# Patient Record
Sex: FEMALE | Race: BLACK OR AFRICAN AMERICAN | NOT HISPANIC OR LATINO
[De-identification: names, ages, dates, MRNs, and addresses within clinical notes are randomized per-mention and may not be internally consistent; named-entity substitution may affect disease eponyms.]

---

## 2019-11-25 ENCOUNTER — APPOINTMENT (OUTPATIENT)
Dept: FAMILY MEDICINE | Facility: CLINIC | Age: 24
End: 2019-11-25

## 2019-11-27 ENCOUNTER — LABORATORY RESULT (OUTPATIENT)
Age: 24
End: 2019-11-27

## 2019-11-27 ENCOUNTER — NON-APPOINTMENT (OUTPATIENT)
Age: 24
End: 2019-11-27

## 2019-11-27 ENCOUNTER — APPOINTMENT (OUTPATIENT)
Dept: INTERNAL MEDICINE | Facility: CLINIC | Age: 24
End: 2019-11-27
Payer: MEDICAID

## 2019-11-27 VITALS
DIASTOLIC BLOOD PRESSURE: 78 MMHG | HEIGHT: 61 IN | WEIGHT: 102 LBS | HEART RATE: 88 BPM | SYSTOLIC BLOOD PRESSURE: 118 MMHG | OXYGEN SATURATION: 99 % | BODY MASS INDEX: 19.26 KG/M2 | TEMPERATURE: 97.6 F

## 2019-11-27 DIAGNOSIS — Z00.00 ENCOUNTER FOR GENERAL ADULT MEDICAL EXAMINATION W/OUT ABNORMAL FINDINGS: ICD-10-CM

## 2019-11-27 DIAGNOSIS — K62.5 HEMORRHAGE OF ANUS AND RECTUM: ICD-10-CM

## 2019-11-27 LAB — HCG UR QL: NEGATIVE

## 2019-11-27 PROCEDURE — 36415 COLL VENOUS BLD VENIPUNCTURE: CPT

## 2019-11-27 PROCEDURE — 81025 URINE PREGNANCY TEST: CPT

## 2019-11-27 PROCEDURE — 99204 OFFICE O/P NEW MOD 45 MIN: CPT | Mod: 25

## 2019-11-27 PROCEDURE — 93000 ELECTROCARDIOGRAM COMPLETE: CPT

## 2019-11-27 NOTE — PHYSICAL EXAM
[No Acute Distress] : no acute distress [Well Nourished] : well nourished [Normal Sclera/Conjunctiva] : normal sclera/conjunctiva [Normal Oropharynx] : the oropharynx was normal [No Lymphadenopathy] : no lymphadenopathy [Thyroid Normal, No Nodules] : the thyroid was normal and there were no nodules present [Clear to Auscultation] : lungs were clear to auscultation bilaterally [Normal Rate] : normal rate  [Regular Rhythm] : with a regular rhythm [No Edema] : there was no peripheral edema [Soft] : abdomen soft [Non Tender] : non-tender [No HSM] : no HSM [Declined Rectal Exam] : declined rectal exam [No CVA Tenderness] : no CVA  tenderness [No Rash] : no rash [Normal] : affect was normal and insight and judgment were intact

## 2019-11-27 NOTE — PLAN
[FreeTextEntry1] : 25 yo w chronic fatigue/anemia/heavy menses x 5 yrs - dx  iron def anemia- advised  BCP and IV iron infusion but did not f/u- takes PO iron on/off- thinks may have been told of Sickle Cell trait- occasional BRBPR x 5 years w hard stool/constipation - \par \par 1.chronic fatigue- h/o iron def anemia/iron def - check CBC CMP TFT UA UPT FS Hb a1c Vitamin B12, folic acid, iron ferritin Hb electrophoresis, LINDSAY, EKG , drug screen\par \par 2. possible h/o Sickle cell Trait- check Hb electrophoresis, folic acid\par \par 3. recurrent UTI- check UA Hb a1c ref given GYN\par \par 4. heavy menses unclear etiology- continues to decline BCP- check CBC, TFTs- ref given GYN \par \par 5. BRBPR- may be due to chronic constipation - declined rectal/guaiac exam/ref to GI- check CBC- increase PO fluids, fruits, vegetables- start Colace 1-2 tabs daily- declined ref to GI- Potential consequences reviewed. Patient expressed understanding.\par \par 6. Flu shot, Tdap- declined\par \par 7. Gardasil- does not know if had vaccine- pt to send copies of immunization record \par \par \par f/u after above

## 2019-11-27 NOTE — REVIEW OF SYSTEMS
[Fatigue] : fatigue [Constipation] : constipation [Negative] : Heme/Lymph [Fever] : no fever [Vision Problems] : no vision problems [Hearing Loss] : no hearing loss [Chest Pain] : no chest pain [Palpitations] : no palpitations [Shortness Of Breath] : no shortness of breath [Abdominal Pain] : no abdominal pain [Heartburn] : no heartburn [Melena] : no melena [Dysuria] : no dysuria [Hematuria] : no hematuria [Skin Rash] : no skin rash [FreeTextEntry2] : a [FreeTextEntry7] : BRBPR

## 2019-11-27 NOTE — HISTORY OF PRESENT ILLNESS
[FreeTextEntry1] : "I need a ref for IV iron"\par  [de-identified] : c/o h/o anemia for past 4 yo due to heavy menses- advised IV iron- did not f/u\par heavy menses- etiology?- declined BCP- \par last f/u GYN 9/2019 walk in clinic - dx BV- tx Metro gel - advised BCP for heavy menses- declined \par no h/o painless hematuria/hemoptysis/melena/PUD\par no malar rash/alopecia/joint pains \par always w desire to eat ice \par h/o BRBPR on toilet tissue x 5 years - never evaluated- chronic constipation- no rectal pain- no h/o hemorrhoids- OTC stool softeners on/off  w relief \par h/o UTI recurring - saw GYN 9/2019- told due to how wipes after urinating \par LMP 3 weeks ago- [facility] - uses condoms w sex toys - \par not sure if had Gardasil- last Tdap ??\par OTC none \par work student

## 2019-12-02 ENCOUNTER — APPOINTMENT (OUTPATIENT)
Dept: INTERNAL MEDICINE | Facility: CLINIC | Age: 24
End: 2019-12-02

## 2019-12-05 ENCOUNTER — APPOINTMENT (OUTPATIENT)
Dept: INTERNAL MEDICINE | Facility: CLINIC | Age: 24
End: 2019-12-05
Payer: MEDICAID

## 2019-12-05 VITALS
OXYGEN SATURATION: 100 % | SYSTOLIC BLOOD PRESSURE: 119 MMHG | HEIGHT: 61 IN | HEART RATE: 95 BPM | DIASTOLIC BLOOD PRESSURE: 80 MMHG | BODY MASS INDEX: 19.26 KG/M2 | WEIGHT: 102 LBS | TEMPERATURE: 97.8 F

## 2019-12-05 DIAGNOSIS — R79.89 OTHER SPECIFIED ABNORMAL FINDINGS OF BLOOD CHEMISTRY: ICD-10-CM

## 2019-12-05 DIAGNOSIS — N92.0 EXCESSIVE AND FREQUENT MENSTRUATION WITH REGULAR CYCLE: ICD-10-CM

## 2019-12-05 DIAGNOSIS — R53.82 CHRONIC FATIGUE, UNSPECIFIED: ICD-10-CM

## 2019-12-05 LAB
25(OH)D3 SERPL-MCNC: 12.4 NG/ML
ALBUMIN SERPL ELPH-MCNC: 4.6 G/DL
ALP BLD-CCNC: 55 U/L
ALT SERPL-CCNC: 11 U/L
AMPHET UR-MCNC: NEGATIVE
ANA SER IF-ACNC: NEGATIVE
ANION GAP SERPL CALC-SCNC: 12 MMOL/L
APPEARANCE: ABNORMAL
AST SERPL-CCNC: 23 U/L
BACTERIA: NEGATIVE
BARBITURATES UR-MCNC: NEGATIVE
BASOPHILS # BLD AUTO: 0.02 K/UL
BASOPHILS NFR BLD AUTO: 0.5 %
BENZODIAZ UR-MCNC: NEGATIVE
BILIRUB SERPL-MCNC: 0.2 MG/DL
BILIRUBIN URINE: NEGATIVE
BLOOD URINE: NEGATIVE
BUN SERPL-MCNC: 11 MG/DL
CALCIUM SERPL-MCNC: 9.6 MG/DL
CHLORIDE SERPL-SCNC: 104 MMOL/L
CO2 SERPL-SCNC: 23 MMOL/L
COCAINE METAB.OTHER UR-MCNC: NEGATIVE
COLOR: YELLOW
CREAT SERPL-MCNC: 0.72 MG/DL
CREATININE, URINE: 215.5 MG/DL
DEPRECATED KAPPA LC FREE/LAMBDA SER: 1.38 RATIO
EOSINOPHIL # BLD AUTO: 0.22 K/UL
EOSINOPHIL NFR BLD AUTO: 5.3 %
ESTIMATED AVERAGE GLUCOSE: 100 MG/DL
FERRITIN SERPL-MCNC: 10 NG/ML
FOLATE RBC-MCNC: 871 NG/ML
GLUCOSE QUALITATIVE U: NEGATIVE
GLUCOSE SERPL-MCNC: 74 MG/DL
HBA1C MFR BLD HPLC: 5.1 %
HCT VFR BLD CALC: 35.7 %
HCT VFR BLD CALC: 36.5 %
HGB A MFR BLD: 97.7 %
HGB A2 MFR BLD: 2.3 %
HGB BLD-MCNC: 11.1 G/DL
HGB FRACT BLD-IMP: NORMAL
HYALINE CASTS: 6 /LPF
IGA SER QL IEP: 115 MG/DL
IGG SER QL IEP: 1634 MG/DL
IGM SER QL IEP: 147 MG/DL
IMM GRANULOCYTES NFR BLD AUTO: 0.2 %
IRON SATN MFR SERPL: 7 %
IRON SERPL-MCNC: 25 UG/DL
KAPPA LC CSF-MCNC: 1.17 MG/DL
KAPPA LC SERPL-MCNC: 1.62 MG/DL
KETONES URINE: NEGATIVE
LEUKOCYTE ESTERASE URINE: NEGATIVE
LYMPHOCYTES # BLD AUTO: 1.38 K/UL
LYMPHOCYTES NFR BLD AUTO: 33.5 %
MAN DIFF?: NORMAL
MCHC RBC-ENTMCNC: 25.6 PG
MCHC RBC-ENTMCNC: 30.4 GM/DL
MCV RBC AUTO: 84.3 FL
METHADONE UR-MCNC: NEGATIVE
METHAQUALONE UR-MCNC: NEGATIVE
MICROSCOPIC-UA: NORMAL
MONOCYTES # BLD AUTO: 0.28 K/UL
MONOCYTES NFR BLD AUTO: 6.8 %
NEUTROPHILS # BLD AUTO: 2.21 K/UL
NEUTROPHILS NFR BLD AUTO: 53.7 %
NITRITE URINE: NEGATIVE
OPIATES UR-MCNC: NEGATIVE
PCP UR-MCNC: NEGATIVE
PH URINE: 6.5
PLATELET # BLD AUTO: 189 K/UL
POTASSIUM SERPL-SCNC: 4.4 MMOL/L
PROPOXYPH UR QL: NEGATIVE
PROT SERPL-MCNC: 7.4 G/DL
PROTEIN URINE: NORMAL
RBC # BLD: 4.33 M/UL
RBC # FLD: 15.3 %
RED BLOOD CELLS URINE: 0 /HPF
SODIUM SERPL-SCNC: 139 MMOL/L
SPECIFIC GRAVITY URINE: 1.03
SQUAMOUS EPITHELIAL CELLS: 13 /HPF
THC UR QL: NEGATIVE
TIBC SERPL-MCNC: 384 UG/DL
TSH SERPL-ACNC: 5.16 UIU/ML
UIBC SERPL-MCNC: 359 UG/DL
UROBILINOGEN URINE: NORMAL
VIT B12 SERPL-MCNC: 1309 PG/ML
WBC # FLD AUTO: 4.12 K/UL
WHITE BLOOD CELLS URINE: 1 /HPF

## 2019-12-05 PROCEDURE — 36415 COLL VENOUS BLD VENIPUNCTURE: CPT

## 2019-12-05 PROCEDURE — 99214 OFFICE O/P EST MOD 30 MIN: CPT | Mod: 25

## 2019-12-05 NOTE — REVIEW OF SYSTEMS
[Fatigue] : fatigue [Constipation] : constipation [Negative] : Psychiatric [Fever] : no fever [Vision Problems] : no vision problems [Hearing Loss] : no hearing loss [Palpitations] : no palpitations [Chest Pain] : no chest pain [Shortness Of Breath] : no shortness of breath [Heartburn] : no heartburn [Melena] : no melena [Abdominal Pain] : no abdominal pain [Dysuria] : no dysuria [Hematuria] : no hematuria [Skin Rash] : no skin rash [FreeTextEntry7] : BRBPR

## 2019-12-05 NOTE — PLAN
[FreeTextEntry1] : FLu vaccine - continues to decline - Potential consequences including death reviewed. Patient expressed understanding.\par \par Tdap- continues to decline - Potential consequences including death of infant reviewed. Patient expressed understanding.\par \par Gardasil- not sure if got vaccines- pt to attempt to get copy immunization record- explained had until age 26- Potential consequences including caner reviewed. Patient expressed understanding.\par \par HIV and Hep C testing-  continues to decline- Potential consequences including death reviewed. Patient expressed understanding.\par \par I spent >25 minutes with patient. More than 50% of the time was spent on counseling and coordination of of the problems listed.\par

## 2019-12-09 LAB — TSH SERPL-ACNC: 2.1 UIU/ML

## 2019-12-12 ENCOUNTER — TRANSCRIPTION ENCOUNTER (OUTPATIENT)
Age: 24
End: 2019-12-12

## 2020-01-06 ENCOUNTER — APPOINTMENT (OUTPATIENT)
Dept: HEMATOLOGY ONCOLOGY | Facility: CLINIC | Age: 25
End: 2020-01-06
Payer: MEDICAID

## 2020-01-06 VITALS
WEIGHT: 109.6 LBS | HEIGHT: 61 IN | HEART RATE: 87 BPM | DIASTOLIC BLOOD PRESSURE: 73 MMHG | SYSTOLIC BLOOD PRESSURE: 109 MMHG | BODY MASS INDEX: 20.69 KG/M2 | RESPIRATION RATE: 14 BRPM | TEMPERATURE: 98.2 F | OXYGEN SATURATION: 100 %

## 2020-01-06 DIAGNOSIS — D50.0 IRON DEFICIENCY ANEMIA SECONDARY TO BLOOD LOSS (CHRONIC): ICD-10-CM

## 2020-01-06 DIAGNOSIS — Z78.9 OTHER SPECIFIED HEALTH STATUS: ICD-10-CM

## 2020-01-06 PROCEDURE — 99204 OFFICE O/P NEW MOD 45 MIN: CPT | Mod: 25

## 2020-01-06 PROCEDURE — 36415 COLL VENOUS BLD VENIPUNCTURE: CPT

## 2020-01-07 LAB
ANA SER IF-ACNC: NEGATIVE
APTT BLD: 39 SEC
BASOPHILS # BLD AUTO: 0.02 K/UL
BASOPHILS NFR BLD AUTO: 0.5 %
EOSINOPHIL # BLD AUTO: 0.22 K/UL
EOSINOPHIL NFR BLD AUTO: 5.1 %
ERYTHROCYTE [SEDIMENTATION RATE] IN BLOOD BY WESTERGREN METHOD: 76 MM/HR
FACT VIII ACT/NOR PPP: 91 %
FERRITIN SERPL-MCNC: 14 NG/ML
HAPTOGLOB SERPL-MCNC: 124 MG/DL
HCT VFR BLD CALC: 37.5 %
HGB BLD-MCNC: 11.5 G/DL
IGA SER QL IEP: 121 MG/DL
IMM GRANULOCYTES NFR BLD AUTO: 0.2 %
IRON SATN MFR SERPL: 6 %
IRON SERPL-MCNC: 24 UG/DL
LDH SERPL-CCNC: 200 U/L
LYMPHOCYTES # BLD AUTO: 1.22 K/UL
LYMPHOCYTES NFR BLD AUTO: 28.3 %
MAN DIFF?: NORMAL
MCHC RBC-ENTMCNC: 25.4 PG
MCHC RBC-ENTMCNC: 30.7 GM/DL
MCV RBC AUTO: 83 FL
MONOCYTES # BLD AUTO: 0.23 K/UL
MONOCYTES NFR BLD AUTO: 5.3 %
NEUTROPHILS # BLD AUTO: 2.61 K/UL
NEUTROPHILS NFR BLD AUTO: 60.6 %
PLATELET # BLD AUTO: 184 K/UL
RBC # BLD: 4.52 M/UL
RBC # FLD: 15.3 %
TIBC SERPL-MCNC: 423 UG/DL
TTG IGA SER IA-ACNC: <1.2 U/ML
TTG IGA SER-ACNC: NEGATIVE
TTG IGG SER IA-ACNC: 3.4 U/ML
TTG IGG SER IA-ACNC: NEGATIVE
UIBC SERPL-MCNC: 399 UG/DL
VWF AG PPP IA-ACNC: 108 %
VWF:RCO ACT/NOR PPP PL AGG: 87 %
WBC # FLD AUTO: 4.31 K/UL

## 2020-01-07 NOTE — HISTORY OF PRESENT ILLNESS
[de-identified] : 24 years old [place] American female was found to have iron deficiency anemia after a rectal bleed... Patient's hemoglobin is 11.1... I had a long discussion with patient explaining to her that intravenous iron is usually reserved for people with hemoglobin below 10 g/dL as the procedure carries the risk of allergic reaction and is usually not reimbursed by insurances...

## 2020-01-07 NOTE — CONSULT LETTER
[Dear  ___] : Dear  [unfilled], [Consult Letter:] : I had the pleasure of evaluating your patient, [unfilled]. [Please see my note below.] : Please see my note below. [Consult Closing:] : Thank you very much for allowing me to participate in the care of this patient.  If you have any questions, please do not hesitate to contact me. [Sincerely,] : Sincerely, [FreeTextEntry3] : Yenny Meyers MD\par

## 2020-01-07 NOTE — ASSESSMENT
[FreeTextEntry1] : Repeat blood work, patient's hemoglobin is within normal limits... However her iron stores are low and she can benefit from oral iron..\par \par Patient is very upset crying and demands to get IV iron today... I explained to patient that was a hemoglobin of 11.1 IV iron is not indicated... We will have repeat CBC and decide if intravenous iron is warranted.\par \par She was given GI and GYN referral, and information about intravenous iron

## 2020-01-17 LAB — VWF MULTIMERS PPP IA-ACNC: NORMAL

## 2020-03-10 ENCOUNTER — TRANSCRIPTION ENCOUNTER (OUTPATIENT)
Age: 25
End: 2020-03-10

## 2020-11-26 ENCOUNTER — TRANSCRIPTION ENCOUNTER (OUTPATIENT)
Age: 25
End: 2020-11-26

## 2021-04-02 ENCOUNTER — TRANSCRIPTION ENCOUNTER (OUTPATIENT)
Age: 26
End: 2021-04-02

## 2022-04-08 ENCOUNTER — TRANSCRIPTION ENCOUNTER (OUTPATIENT)
Age: 27
End: 2022-04-08

## 2022-11-28 ENCOUNTER — NON-APPOINTMENT (OUTPATIENT)
Age: 27
End: 2022-11-28

## 2022-12-06 ENCOUNTER — NON-APPOINTMENT (OUTPATIENT)
Age: 27
End: 2022-12-06

## 2025-05-12 ENCOUNTER — EMERGENCY (EMERGENCY)
Facility: HOSPITAL | Age: 30
LOS: 1 days | End: 2025-05-12
Attending: EMERGENCY MEDICINE | Admitting: EMERGENCY MEDICINE
Payer: MEDICAID

## 2025-05-12 VITALS
HEART RATE: 102 BPM | OXYGEN SATURATION: 100 % | DIASTOLIC BLOOD PRESSURE: 77 MMHG | TEMPERATURE: 98 F | RESPIRATION RATE: 18 BRPM | SYSTOLIC BLOOD PRESSURE: 115 MMHG

## 2025-05-12 VITALS
DIASTOLIC BLOOD PRESSURE: 70 MMHG | TEMPERATURE: 99 F | SYSTOLIC BLOOD PRESSURE: 110 MMHG | OXYGEN SATURATION: 99 % | HEART RATE: 96 BPM | RESPIRATION RATE: 16 BRPM

## 2025-05-12 LAB
ALBUMIN SERPL ELPH-MCNC: 3.4 G/DL — SIGNIFICANT CHANGE UP (ref 3.4–5)
ALP SERPL-CCNC: 64 U/L — SIGNIFICANT CHANGE UP (ref 40–120)
ALT FLD-CCNC: 17 U/L — SIGNIFICANT CHANGE UP (ref 12–42)
ANION GAP SERPL CALC-SCNC: 6 MMOL/L — LOW (ref 9–16)
APPEARANCE UR: CLEAR — SIGNIFICANT CHANGE UP
AST SERPL-CCNC: 22 U/L — SIGNIFICANT CHANGE UP (ref 15–37)
BASOPHILS # BLD AUTO: 0.02 K/UL — SIGNIFICANT CHANGE UP (ref 0–0.2)
BASOPHILS NFR BLD AUTO: 0.3 % — SIGNIFICANT CHANGE UP (ref 0–2)
BILIRUB SERPL-MCNC: 0.2 MG/DL — SIGNIFICANT CHANGE UP (ref 0.2–1.2)
BILIRUB UR-MCNC: NEGATIVE — SIGNIFICANT CHANGE UP
BUN SERPL-MCNC: 7 MG/DL — SIGNIFICANT CHANGE UP (ref 7–23)
CALCIUM SERPL-MCNC: 9.7 MG/DL — SIGNIFICANT CHANGE UP (ref 8.5–10.5)
CHLORIDE SERPL-SCNC: 102 MMOL/L — SIGNIFICANT CHANGE UP (ref 96–108)
CO2 SERPL-SCNC: 24 MMOL/L — SIGNIFICANT CHANGE UP (ref 22–31)
COLOR SPEC: YELLOW — SIGNIFICANT CHANGE UP
CREAT SERPL-MCNC: 0.64 MG/DL — SIGNIFICANT CHANGE UP (ref 0.5–1.3)
DIFF PNL FLD: NEGATIVE — SIGNIFICANT CHANGE UP
EGFR: 122 ML/MIN/1.73M2 — SIGNIFICANT CHANGE UP
EGFR: 122 ML/MIN/1.73M2 — SIGNIFICANT CHANGE UP
EOSINOPHIL # BLD AUTO: 0.25 K/UL — SIGNIFICANT CHANGE UP (ref 0–0.5)
EOSINOPHIL NFR BLD AUTO: 3.5 % — SIGNIFICANT CHANGE UP (ref 0–6)
GLUCOSE SERPL-MCNC: 95 MG/DL — SIGNIFICANT CHANGE UP (ref 70–99)
GLUCOSE UR QL: 500 MG/DL
HCG SERPL-ACNC: HIGH MIU/ML
HCT VFR BLD CALC: 33.1 % — LOW (ref 34.5–45)
HGB BLD-MCNC: 10.2 G/DL — LOW (ref 11.5–15.5)
IMM GRANULOCYTES # BLD AUTO: 0.03 K/UL — SIGNIFICANT CHANGE UP (ref 0–0.07)
IMM GRANULOCYTES NFR BLD AUTO: 0.4 % — SIGNIFICANT CHANGE UP (ref 0–0.9)
KETONES UR QL: ABNORMAL MG/DL
LEUKOCYTE ESTERASE UR-ACNC: NEGATIVE — SIGNIFICANT CHANGE UP
LYMPHOCYTES # BLD AUTO: 2.07 K/UL — SIGNIFICANT CHANGE UP (ref 1–3.3)
LYMPHOCYTES NFR BLD AUTO: 29.2 % — SIGNIFICANT CHANGE UP (ref 13–44)
MAGNESIUM SERPL-MCNC: 1.9 MG/DL — SIGNIFICANT CHANGE UP (ref 1.6–2.6)
MCHC RBC-ENTMCNC: 23 PG — LOW (ref 27–34)
MCHC RBC-ENTMCNC: 30.8 G/DL — LOW (ref 32–36)
MCV RBC AUTO: 74.7 FL — LOW (ref 80–100)
MONOCYTES # BLD AUTO: 0.45 K/UL — SIGNIFICANT CHANGE UP (ref 0–0.9)
MONOCYTES NFR BLD AUTO: 6.3 % — SIGNIFICANT CHANGE UP (ref 2–14)
NEUTROPHILS # BLD AUTO: 4.27 K/UL — SIGNIFICANT CHANGE UP (ref 1.8–7.4)
NEUTROPHILS NFR BLD AUTO: 60.3 % — SIGNIFICANT CHANGE UP (ref 43–77)
NITRITE UR-MCNC: NEGATIVE — SIGNIFICANT CHANGE UP
NRBC # BLD AUTO: 0 K/UL — SIGNIFICANT CHANGE UP (ref 0–0)
NRBC # FLD: 0 K/UL — SIGNIFICANT CHANGE UP (ref 0–0)
NRBC BLD AUTO-RTO: 0 /100 WBCS — SIGNIFICANT CHANGE UP (ref 0–0)
PH UR: 6 — SIGNIFICANT CHANGE UP (ref 5–8)
PLATELET # BLD AUTO: 193 K/UL — SIGNIFICANT CHANGE UP (ref 150–400)
PMV BLD: 11.7 FL — SIGNIFICANT CHANGE UP (ref 7–13)
POTASSIUM SERPL-MCNC: 4.5 MMOL/L — SIGNIFICANT CHANGE UP (ref 3.5–5.3)
POTASSIUM SERPL-SCNC: 4.5 MMOL/L — SIGNIFICANT CHANGE UP (ref 3.5–5.3)
PROT SERPL-MCNC: 8.2 G/DL — SIGNIFICANT CHANGE UP (ref 6.4–8.2)
PROT UR-MCNC: NEGATIVE MG/DL — SIGNIFICANT CHANGE UP
RBC # BLD: 4.43 M/UL — SIGNIFICANT CHANGE UP (ref 3.8–5.2)
RBC # FLD: 17.2 % — HIGH (ref 10.3–14.5)
SODIUM SERPL-SCNC: 132 MMOL/L — SIGNIFICANT CHANGE UP (ref 132–145)
SP GR SPEC: 1.03 — SIGNIFICANT CHANGE UP (ref 1–1.03)
UROBILINOGEN FLD QL: 1 MG/DL — SIGNIFICANT CHANGE UP (ref 0.2–1)
WBC # BLD: 7.09 K/UL — SIGNIFICANT CHANGE UP (ref 3.8–10.5)
WBC # FLD AUTO: 7.09 K/UL — SIGNIFICANT CHANGE UP (ref 3.8–10.5)

## 2025-05-12 PROCEDURE — 99284 EMERGENCY DEPT VISIT MOD MDM: CPT

## 2025-05-12 RX ORDER — ONDANSETRON HCL/PF 4 MG/2 ML
4 VIAL (ML) INJECTION ONCE
Refills: 0 | Status: COMPLETED | OUTPATIENT
Start: 2025-05-12 | End: 2025-05-12

## 2025-05-12 RX ORDER — SODIUM CHLORIDE 9 G/1000ML
1000 INJECTION, SOLUTION INTRAVENOUS
Refills: 0 | Status: ACTIVE | OUTPATIENT
Start: 2025-05-12 | End: 2025-05-12

## 2025-05-12 RX ORDER — PROMETHAZINE HYDROCHLORIDE 25 MG/ML
1 INJECTION INTRAMUSCULAR; INTRAVENOUS
Qty: 2 | Refills: 0
Start: 2025-05-12

## 2025-05-12 RX ADMIN — SODIUM CHLORIDE 1000 MILLILITER(S): 9 INJECTION, SOLUTION INTRAVENOUS at 21:42

## 2025-05-12 NOTE — ED PROVIDER NOTE - PROGRESS NOTE DETAILS
Called lab to confirm that patient did not have bacteria in her urine which they were able to do over the phone.  States that since her urine did not show signs of leukocyte esterase or nitrites they did not do a micro reflexively.

## 2025-05-12 NOTE — ED PROVIDER NOTE - CLINICAL SUMMARY MEDICAL DECISION MAKING FREE TEXT BOX
HPI:   30-year-old female currently pregnant, , with an estimated gestational age of 14 weeks, due date 11/10/2025. She presents to the emergency department with persistent nausea and vomiting since discovering her pregnancy despite treatment attempts with doxylamine at home. Her provider advised that by this point in her pregnancy, these symptoms should be waning, and recommended an ED visit for evaluation. She is concerned about the possibility of hyperemesis gravidarum but prefers a professional evaluation over self-diagnosis. She denies chest pain, shortness of breath, abdominal pain, vaginal bleeding/discharge, bowel/bladder habit changes, or noticeable weight loss.    PE:  - General: Well-appearing, no acute distress, alert and oriented to person, place, and time (A&Ox3).  - Respiratory: Non-labored respirations, clear to auscultation bilaterally.  - Cardiovascular: Tachycardic heart rate with regular rhythm.  - Gastrointestinal: Abdomen is gravid, nontender, nondistended.    MDM:   Persistent nausea and vomiting at 14 weeks of gestation warrant evaluation for hyperemesis gravidarum and other underlying causes, ensuring hydration and symptomatic relief.  - Differential Diagnoses:    - Hyperemesis gravidarum: Possible given the severity and persistence of symptoms beyond typical gestational nausea.    - Gastrointestinal or metabolic imbalance: Possible as an exacerbating factor.    - Normal gestational nausea: Persistent symptomatology can overlap.    Plan:    - Obtain laboratory tests, including electrolyte panel and renal function tests, to evaluate for dehydration and metabolic disturbances.  - Conduct urine studies to assess ketones and renal status.  - Hydrate the patient with intravenous D5 NS to address potential dehydration.  - Discuss treatment options for nausea, including Zofran or Phenergan, recognizing patient's preference after consultation with her OB/GYN advice. At this time, she is deciding if she would like to pursue these options or not.   - Monitor clinical response to hydration and assess the need for further intervention based on lab results.  - Provide reassurance and education regarding ongoing management of pregnancy-related symptoms.  - Recommend follow-up with her OB/GYN for continued prenatal care and symptom management.

## 2025-05-12 NOTE — ED PROVIDER NOTE - BIRTH SEX
Female Cyclosporine Pregnancy And Lactation Text: This medication is Pregnancy Category C and it isn't know if it is safe during pregnancy. This medication is excreted in breast milk.

## 2025-05-12 NOTE — ED ADULT TRIAGE NOTE - CHIEF COMPLAINT QUOTE
Walk in pt with complaints of nausea and vomiting since last night. Endorses she is 14 weeks pregnant.

## 2025-05-12 NOTE — ED PROVIDER NOTE - PATIENT PORTAL LINK FT
You can access the FollowMyHealth Patient Portal offered by [facility] by registering at the following website: [email or web address]. By joining [facility]’s FollowMyHealth portal, you will also be able to view your health information using other applications (apps) compatible with our system.

## 2025-05-12 NOTE — ED PROVIDER NOTE - NSFOLLOWUPINSTRUCTIONS_ED_ALL_ED_FT
INTRODUCTION    Between 50 and 90 percent of pregnant individuals have some degree of nausea, with or without vomiting, in the first half of pregnancy. This is commonly referred to as "morning sickness" or "nausea and vomiting of pregnancy"; nausea and vomiting of pregnancy is the more widely used medical term. The duration and severity of symptoms varies.    This topic discusses ways to prevent and relieve nausea and vomiting, with and without medications. A more detailed article is available by subscription.     NAUSEA AND VOMITING OF PREGNANCY VERSUS HYPEREMESIS GRAVIDARUM    Nausea and vomiting of pregnancy — Nausea and vomiting of pregnancy is the term often used to describe mild nausea and vomiting due to pregnancy (and not due to an illness). Symptoms may occur at any time of day and many individuals (80 percent) feel sick throughout the day. Symptoms may include nausea only, vomiting only, or both.    Nausea and vomiting of pregnancy often develop by 5 to 6 weeks of gestation. Symptoms usually peak around 9 weeks and improve by 16 to 18 weeks of gestation. However, symptoms continue into the third trimester in 15 to 20 percent of individuals and until delivery in 5 percent of people.    While symptoms may be distressing, individuals with mild nausea and vomiting during pregnancy experience fewer miscarriages and stillbirths than those without these symptoms.    Hyperemesis gravidarum — Hyperemesis gravidarum is the term used to describe more severe nausea and vomiting of pregnancy. Individuals with this condition often vomit multiple times every day, are unable to tolerate food and liquids, and may lose more than 5 percent of their prepregnancy body weight. They may become dehydrated and may develop vitamin and other nutrient deficiencies over time if they are not treated. Severe symptoms commonly require evaluation and admission to the hospital and treatment with medication(s).    CAUSE OF NAUSEA AND VOMITING IN PREGNANCY    The cause of pregnancy-related nausea and vomiting is unclear. Several theories have been proposed, although none have been definitively proven. Various effects from increased hormone levels of pregnancy, a genetic predisposition, and psychological factors are among the more common theories.    RISK FACTORS    Some individuals are more likely to develop nausea and vomiting of pregnancy, including those who:  Developed these symptoms in a previous pregnancy  Experience nausea and vomiting while taking estrogen (for example, in birth control pills) or have menstrual migraines  Experience motion sickness  Have family members (especially sisters or mothers) who had these symptoms in pregnancy  Have a history of gastrointestinal problems (ie, reflux, ulcers)  Have twins, triplets, or other multiples in the current pregnancy  Have a molar pregnancy (a type of abnormal placenta and pregnancy)    WHEN TO SEEK HELP    Many individuals, especially those with mild nausea and/or vomiting, do not need medical treatment but should still let their obstetric care provider know if they are having symptoms. The provider can then provide suggestions to help reduce symptoms or determine if treatment with a medication is advisable. (See 'Treatment of nausea and vomiting in pregnancy' below.)    Talk with your obstetric care provider right away if you have one or more of the following:  Signs of dehydration, including infrequent urination, dark-colored urine, or dizziness with standing  Vomiting repeatedly throughout the day, especially if you see any blood in the vomit      Abdominal or pelvic pain or cramping  Inability to keep down any food or drinks for more than 12 hours  Weight loss of more than 5 pounds (2.3 kg)  Fever or diarrhea in addition to nausea and vomiting  Nausea of vomiting that begins after 24 weeks of gestation  Feelings of hopelessness, wanting to end the pregnancy, or having suicidal thoughts because of the severity of nausea/vomiting symptoms    One or more tests may be recommended to determine if there is another cause for nausea and vomiting. These tests may include blood tests, urine tests, or an ultrasound examination.    TREATMENT OF NAUSEA AND VOMITING IN PREGNANCY    Treatment can range from dietary and lifestyle changes to use of one of more medications. You may need to try several forms of treatment or a combination of treatments over a period of weeks before finding what works best for you.    Treatment may not completely take away all your symptoms of nausea and vomiting. The goal is to make the symptoms tolerable so that you can eat and drink enough for appropriate fetal growth and have a reasonable quality of life. Fortunately, symptoms typically resolve by mid-pregnancy, whether or not you require any treatment.    Dietary changes — Nausea and vomiting can be made worse by eating too much, not eating enough, or avoiding food altogether. Here are some ways to alter your diet that may help your symptoms:  Try eating before or as soon as you feel hungry to avoid an empty stomach  Eat snacks frequently; have multiple small meals (eg, six small meals a day) that are high in protein with carbohydrates and low in fat rather than a few large meals  Stick to a bland diet  Stay well hydrated  Drink cold, clear, and carbonated or sour fluids (eg, ginger ale, lemonade) and drink these in small amounts between meals  Try smelling fresh lemon, mint, or orange or using an oil diffuser with these scents    Avoid triggers — One of the most important ways to manage pregnancy-related nausea and vomiting is to avoid odors, tastes, and other activities that trigger nausea. Eliminating food triggers, like spicy, sugary, and high fat foods, helps some individuals.    Other examples of triggers include:  Skipping meals  Sleep deprivation  Stuffy rooms  Strong odors (eg, perfume, chemicals, coffee, food, smoke)  Heat and humidity  Noise  Visual or physical motion (eg, flickering lights, driving)  Excessive exercise  Excessive salivation  Feeling tired    Other helpful tips — Other behaviors that may help prevent or relieve symptoms include:  Brushing your teeth after eating.  Avoiding lying down immediately after eating and avoid quickly changing positions. If you have excessive salivation, carrying a cup or bottle into which you can spit rather than swallowing saliva may help.  If you take a prenatal vitamin with iron and this worsens your symptoms, take the vitamin at bedtime. If symptoms persist, stop the vitamins temporarily and let your obstetric care provider know that you stopped. The provider may suggest a chewable prenatal vitamin since some individuals tolerate this better than a vitamin in tablet form. If you stop taking your prenatal vitamin, the provider will suggest taking a supplement that contains 400 to 800 micrograms of folic acid until you are at least 14 weeks pregnant to reduce the risk of congenital anomalies.    Complementary treatments — The following treatments may be useful when used with other treatments.  Acupuncture and acupressure – Acupressure wristbands and acupuncture have become a popular treatment for nausea and vomiting caused by pregnancy, motion sickness, and other causes. In some studies, these wristbands were no better than sham (fake, look-alike) wristbands. However, some individuals find them helpful and neither acupuncture nor acupressure have any known harmful side effects to the pregnant individual or fetus.    Ginger – Consuming ginger containing foods (eg, ginger lollipops, ginger ale) can be helpful managing mild nausea and vomiting. High doses of powdered ginger may help to relieve nausea and vomiting; however, further studies are needed to confirm that high-dose powdered ginger is safe and effective.    Hypnosis and counseling – Hypnosis is helpful for some individuals. Counseling may be helpful for individuals with a history of anxiety or depression.    Treatments to avoid — Marijuana/THC-containing products (and similar products such as CBD oils) have not been shown to be safe in pregnancy and should not be used to self-treat nausea and vomiting of pregnancy. Long-term marijuana use and withdrawal from marijuana may actually increase symptoms of nausea and vomiting. The long-term effects on the fetus' developing brain are also unknown.    Fluids and nutrition — If you are unable to tolerate food or liquids, you may be treated with intravenous (IV) fluids and medications. This may be done in your obstetric care provider's office or in the emergency department of a hospital, depending upon the severity of your symptoms. You may need to be admitted to the hospital. For a short time, you may be advised not to eat or drink anything in order to allow your stomach to rest and give IV medications and fluids time to work. You can slowly begin to eat and drink again as you begin to feel better, usually within 24 to 48 hours. Once you are well enough to go home, it is important that you discuss with your provider what medications to continue at home and for how long to prevent another hospital admission. Most patients will require medications for a period of time at home even after feeling better. These medications can then be tapered and then discontinued as the pregnancy progresses.    If you continue to lose weight or experience severe symptoms despite treatment with fluids and medications, your doctor may consider other forms of feeding, such as the use of a nasogastric tube (a tube that is inserted through your nose into the stomach) or supplemental nutrition through an IV line.    Medications — Medications that reduce nausea and vomiting are effective in many individuals. Some individuals will need more than one medication to have effective symptom relief. None of the medications discussed below are known to be harmful. Make sure you talk with your obstetric care provider before taking any new over-the-counter or prescription medications, including nutritional and herbal supplements.    Vitamin B6 and doxylamine – Over-the-counter vitamin B6 supplements can reduce symptoms of mild to moderate nausea but do not usually help with vomiting. Doxylamine is a medication that can reduce vomiting and may be combined with vitamin B6. Combinations of vitamin B6 and doxylamine formulations are available for the initial treatment of nausea (eg, Diclectin in [place] or Bonjesta in the [place]) and are available as a prescription. Doxylamine is also available in the [place] in some over-the-counter nonprescription sleep aids (eg, Unisom, GoodSense Sleep Aid) and as a prescription antihistamine chewable tablet (Aldex AN).    Antihistamines – Antihistamines, such as diphenhydramine (Benadryl), can sometimes help relieve pregnancy-related nausea and vomiting. Drowsiness is a common side effect.    Anti-nausea medications that are available by prescription include:    Promethazine (Phenergan) – Promethazine is available in pill, oral solution, injectable, or rectal suppository form. It is usually taken every four to six hours and may cause drowsiness and dry mouth. Rare side effects include muscle contractions that cause twisting or jerking movements.    Metoclopramide (Reglan) – Metoclopramide speeds emptying of the stomach and may help to reduce nausea and vomiting. It is available in a pill, oral solution, and injectable form and is usually taken 30 minutes prior to meals and at bedtime. Metoclopramide can also be associated with twisting or jerking movements.    Ondansetron (Zofran) – Ondansetron is an anti-nausea medication that is usually taken by mouth or injection every 8 to 12 hours. Ondansetron should not be taken by individuals with a condition called prolonged QT interval or with other medications that can prolong the QT interval, as this can lead to abnormally and potentially fatal, abnormal heart rhythm. Constipation is a side effect of ondansetron that some patients find bothersome and which can be treated with a stool softener and mild laxative.    Prochlorperazine (Compazine) – Prochlorperazine is available in a pill, rectal suppository, or injectable form. It may cause drowsiness and dry mouth. Rarely, it may cause jerking motions (similar to promethazine and metoclopramide) and should be avoided in patients prone to QT prolongation (similar to ondansetron).    Corticosteroids (hydrocortisone, methylprednisone) – A short course of steroid intravenously followed by a taper of pills may be offered to individuals who do not respond to a combination of the medications listed above.    PROGNOSIS AND OUTCOMES    Most individuals with pregnancy-related nausea and vomiting recover completely without any complications. Those with mild to moderate vomiting may gain less weight during early pregnancy. This is not harmful for the fetus, except possibly if the pregnant individual was very underweight before pregnancy. Normal weight gain during pregnancy depends upon your prepregnancy height and weight. For individuals of normal weight (body mass index 18.5 to 24.9 kilogram/meter2), the recommended weight gain is between 25 and 35 pounds (11.5 to 16.0 kilograms) for a henning pregnancy. However, most of this weight gain normally occurs in the last half of pregnancy, and pregnancy-associated nausea and vomiting has usually resolved by that time.    In individuals with severe nausea and vomiting (hyperemesis gravidarum) who are hospitalized multiple times and who do not gain weight normally during pregnancy, there is a small chance that the baby will be underweight at birth. As a result, your obstetric care provider may recommend an ultrasound to check fetal growth at some point in your pregnancy.    RISK OF RECURRENCE    Individuals who have hyperemesis gravidarum in one pregnancy have a 15 to 20 percent risk of having it again in future pregnancies. Individuals who do not have severe nausea and vomiting in the first pregnancy are less likely to have it in future pregnancies.

## 2025-05-15 DIAGNOSIS — R00.0 TACHYCARDIA, UNSPECIFIED: ICD-10-CM

## 2025-05-15 DIAGNOSIS — O99.891 OTHER SPECIFIED DISEASES AND CONDITIONS COMPLICATING PREGNANCY: ICD-10-CM

## 2025-05-15 DIAGNOSIS — Z91.018 ALLERGY TO OTHER FOODS: ICD-10-CM

## 2025-05-15 DIAGNOSIS — Z3A.14 14 WEEKS GESTATION OF PREGNANCY: ICD-10-CM

## 2025-05-15 DIAGNOSIS — O21.9 VOMITING OF PREGNANCY, UNSPECIFIED: ICD-10-CM

## 2025-05-15 DIAGNOSIS — Z88.2 ALLERGY STATUS TO SULFONAMIDES: ICD-10-CM

## 2025-05-15 DIAGNOSIS — Z91.012 ALLERGY TO EGGS: ICD-10-CM
